# Patient Record
Sex: MALE | Race: OTHER | Employment: FULL TIME | URBAN - METROPOLITAN AREA
[De-identification: names, ages, dates, MRNs, and addresses within clinical notes are randomized per-mention and may not be internally consistent; named-entity substitution may affect disease eponyms.]

---

## 2017-03-07 PROCEDURE — 80074 ACUTE HEPATITIS PANEL: CPT | Performed by: PHYSICIAN ASSISTANT

## 2017-03-07 PROCEDURE — 80050 GENERAL HEALTH PANEL: CPT | Performed by: INTERNAL MEDICINE

## 2017-03-07 PROCEDURE — 80061 LIPID PANEL: CPT | Performed by: INTERNAL MEDICINE

## 2017-03-07 PROCEDURE — 36415 COLL VENOUS BLD VENIPUNCTURE: CPT | Performed by: PHYSICIAN ASSISTANT

## 2017-03-07 PROCEDURE — 84439 ASSAY OF FREE THYROXINE: CPT | Performed by: INTERNAL MEDICINE

## 2017-03-07 PROCEDURE — 86480 TB TEST CELL IMMUN MEASURE: CPT | Performed by: PHYSICIAN ASSISTANT

## 2017-03-07 PROCEDURE — 83036 HEMOGLOBIN GLYCOSYLATED A1C: CPT | Performed by: INTERNAL MEDICINE

## 2018-07-12 PROCEDURE — 86480 TB TEST CELL IMMUN MEASURE: CPT | Performed by: PHYSICIAN ASSISTANT

## 2018-07-12 PROCEDURE — 80074 ACUTE HEPATITIS PANEL: CPT | Performed by: PHYSICIAN ASSISTANT

## 2018-10-26 PROCEDURE — 86480 TB TEST CELL IMMUN MEASURE: CPT | Performed by: NURSE PRACTITIONER

## 2018-10-26 PROCEDURE — 80074 ACUTE HEPATITIS PANEL: CPT | Performed by: NURSE PRACTITIONER

## 2020-03-10 PROBLEM — R03.0 ELEVATED BP WITHOUT DIAGNOSIS OF HYPERTENSION: Status: ACTIVE | Noted: 2020-03-10

## 2021-12-10 PROBLEM — R73.03 PREDIABETES: Status: ACTIVE | Noted: 2021-12-10

## 2021-12-10 PROBLEM — E78.5 DYSLIPIDEMIA: Status: ACTIVE | Noted: 2021-12-10

## 2023-02-02 ENCOUNTER — OFFICE VISIT (OUTPATIENT)
Dept: OCCUPATIONAL MEDICINE | Age: 43
End: 2023-02-02
Attending: PHYSICIAN ASSISTANT

## 2024-04-18 ENCOUNTER — HOSPITAL ENCOUNTER (OUTPATIENT)
Age: 44
Discharge: HOME | End: 2024-04-18
Attending: EMERGENCY MEDICINE
Payer: COMMERCIAL

## 2024-04-18 VITALS
RESPIRATION RATE: 20 BRPM | HEIGHT: 73 IN | SYSTOLIC BLOOD PRESSURE: 146 MMHG | WEIGHT: 235 LBS | BODY MASS INDEX: 31.14 KG/M2 | OXYGEN SATURATION: 96 % | TEMPERATURE: 98 F | HEART RATE: 70 BPM | DIASTOLIC BLOOD PRESSURE: 90 MMHG

## 2024-04-18 DIAGNOSIS — M10.071 ACUTE IDIOPATHIC GOUT OF RIGHT FOOT: Primary | ICD-10-CM

## 2024-04-18 PROCEDURE — 99203 OFFICE O/P NEW LOW 30 MIN: CPT

## 2024-04-18 RX ORDER — METHYLPREDNISOLONE 4 MG/1
TABLET ORAL
Qty: 1 EACH | Refills: 0 | Status: SHIPPED | OUTPATIENT
Start: 2024-04-18

## 2024-04-18 RX ORDER — COLCHICINE 0.6 MG/1
0.6 TABLET ORAL 2 TIMES DAILY
Qty: 6 TABLET | Refills: 0 | Status: SHIPPED | OUTPATIENT
Start: 2024-04-18

## 2024-04-18 RX ORDER — INDOMETHACIN 25 MG/1
25 CAPSULE ORAL
Qty: 12 CAPSULE | Refills: 0 | Status: SHIPPED | OUTPATIENT
Start: 2024-04-18

## 2024-04-18 NOTE — ED PROVIDER NOTES
Patient Seen in: [facility]      History     Chief Complaint   Patient presents with    Foot Swelling     Stated Complaint: foot swelling    Subjective:   HPI    44-year-old male with a history of elevated uric acid levels, presents to the immediate care for complaints of pain tenderness and swelling to his right first MTP joint.  Patient denies any prior history of gouty arthritis.  He states that he has been on a high-protein diet for weight loss.  She denies any trauma.  Denies any other exacerbating leaving factors.      Objective:   History reviewed. No pertinent past medical history.           History reviewed. No pertinent surgical history.             Social History     Socioeconomic History    Marital status: Single   Occupational History    Occupation: [employer]     Comment: sit in office   Tobacco Use    Smoking status: Never    Smokeless tobacco: Never   Vaping Use    Vaping status: Never Used   Substance and Sexual Activity    Alcohol use: Yes     Alcohol/week: 0.0 - 1.0 standard drinks of alcohol     Comment: rare    Drug use: No    Sexual activity: Yes              Review of Systems    Positive for stated complaint: foot swelling  Other systems are as noted in HPI.  Constitutional and vital signs reviewed.      All other systems reviewed and negative except as noted above.    Physical Exam     ED Triage Vitals   BP 04/18/24 1740 (!) 162/91   Pulse 04/18/24 1739 70   Resp 04/18/24 1739 20   Temp 04/18/24 1739 97.6 °F (36.4 °C)   Temp src 04/18/24 1739 Temporal   SpO2 04/18/24 1739 96 %   O2 Device 04/18/24 1739 None (Room air)       Current:/90   Pulse 70   Temp 97.6 °F (36.4 °C) (Temporal)   Resp 20   Ht 185.4 cm (6' 1\")   Wt 106.6 kg   SpO2 96%   BMI 31.00 kg/m²         Physical Exam  General: Alert and oriented. No acute distress.  HEENT: Normocephalic. No evidence of trauma. Extraocular movements are intact.  Cardiovascular exam: Regular rate and rhythm  Lungs: Clear to  auscultation bilaterally.  Extremities: No evidence of deformity. No clubbing or cyanosis.  Patient have tenderness swelling and erythema to his left right MTP joint.  Neuro: No focal deficit is noted.       ED Course   Labs Reviewed - No data to display    Patient's clinical presentation does appear to be consistent with acute gouty arthritis.  Patient will be discharged on Medrol Dosepak.  He will be given Indocin and colchicine.  Recommend follow-up with his primary care doctor.  Patient is advised to decrease his protein intake.  Increase oral hydration.  He is instructed return for any worsening symptoms or new concerns.       MDM   Patient was screened and evaluated during this visit.   As a treating physician attending to the patient, I determined, within reasonable clinical confidence and prior to discharge, that an emergency medical condition was not or was no longer present.  There was no indication for further evaluation, treatment or admission on an emergency basis.  Comprehensive verbal and written discharge and follow-up instructions were provided to help prevent relapse or worsening.  Patient was instructed to follow-up with her primary care provider for further evaluation and treatment, but to return immediately to the ER for worsening, concerning, new, changing or persisting symptoms.  I discussed the case with the patient and they had no questions, complaints, or concerns.  Patient felt comfortable going home.    ^^Please note that this report has been produced using speech recognition software and may contain errors related to that system including, but not limited to, errors in grammar, punctuation, and spelling, as well as words and phrases that possibly may have been recognized inappropriately.  If there are any questions or concerns, contact the dictating provider for clarification                                   Medical Decision Making      Disposition and Plan     Clinical Impression:  1.  Acute idiopathic gout of right foot         Disposition:  Discharge  4/18/2024  5:53 pm    Follow-up:  Ilana Dunn MD  [address]  [address] [place] [address]  [phone]    Call   As needed, If symptoms worsen          Medications Prescribed:  Current Discharge Medication List        START taking these medications    Details   methylPREDNISolone (MEDROL) 4 MG Oral Tablet Therapy Pack Dosepack: take as directed  Qty: 1 each, Refills: 0      colchicine 0.6 MG Oral Tab Take 1 tablet (0.6 mg total) by mouth 2 (two) times daily.  Qty: 6 tablet, Refills: 0      indomethacin 25 MG Oral Cap Take 1 capsule (25 mg total) by mouth 3 (three) times daily with meals.  Qty: 12 capsule, Refills: 0

## 2024-04-18 NOTE — DISCHARGE INSTRUCTIONS
Increase fluids to stay hydrated  Decrease protein intake until symptoms resolve  Take Medrol Dosepak as directed  Take Indocin as needed for pain  Take colchicine as needed for pain  Return if any worsening symptoms or new concerns

## 2024-04-18 NOTE — ED INITIAL ASSESSMENT (HPI)
Pt began 2 days ago with rt foot redness and swelling with pain.  Got worse last night was known to have a high uric acid